# Patient Record
Sex: FEMALE | Race: WHITE | NOT HISPANIC OR LATINO | Employment: UNEMPLOYED | URBAN - METROPOLITAN AREA
[De-identification: names, ages, dates, MRNs, and addresses within clinical notes are randomized per-mention and may not be internally consistent; named-entity substitution may affect disease eponyms.]

---

## 2022-02-17 ENCOUNTER — OFFICE VISIT (OUTPATIENT)
Dept: URGENT CARE | Facility: CLINIC | Age: 27
End: 2022-02-17

## 2022-02-17 ENCOUNTER — APPOINTMENT (OUTPATIENT)
Dept: LAB | Facility: MEDICAL CENTER | Age: 27
End: 2022-02-17
Attending: FAMILY MEDICINE

## 2022-02-17 VITALS
RESPIRATION RATE: 18 BRPM | DIASTOLIC BLOOD PRESSURE: 62 MMHG | OXYGEN SATURATION: 98 % | WEIGHT: 144.6 LBS | BODY MASS INDEX: 24.09 KG/M2 | SYSTOLIC BLOOD PRESSURE: 114 MMHG | HEIGHT: 65 IN | HEART RATE: 68 BPM | TEMPERATURE: 97 F
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
## 2022-02-17 DIAGNOSIS — Z77.21 EXPOSURE TO BLOOD-BORNE PATHOGEN: ICD-10-CM

## 2022-02-17 DIAGNOSIS — S61.239A PUNCTURE WOUND OF FINGER OF RIGHT HAND, INITIAL ENCOUNTER: ICD-10-CM

## 2022-02-17 PROCEDURE — 99203 OFFICE O/P NEW LOW 30 MIN: CPT | Mod: 25 | Performed by: FAMILY MEDICINE

## 2022-02-17 PROCEDURE — 90471 IMMUNIZATION ADMIN: CPT | Performed by: FAMILY MEDICINE

## 2022-02-17 PROCEDURE — 90715 TDAP VACCINE 7 YRS/> IM: CPT | Performed by: FAMILY MEDICINE

## 2022-02-17 ASSESSMENT — ENCOUNTER SYMPTOMS
CHILLS: 0
FEVER: 0
VOMITING: 0
DIZZINESS: 0
NAUSEA: 0
MYALGIAS: 0
SORE THROAT: 0
SHORTNESS OF BREATH: 0
COUGH: 0

## 2022-02-17 NOTE — PATIENT INSTRUCTIONS
Needlestick and Sharps Injury  A needlestick injury happens when a person is stuck with a needle or sharp tool (sharps) that may have someone else's blood on it. Needlestick injuries are most common among health care workers, but can also happen to people in the community who are exposed to needles. A needlestick injury may expose you to blood that carries infections such as:  · Hepatitis B.  · Hepatitis C.  · Human immunodeficiency virus (HIV).  What are the causes?  This injury is caused by a needle or other sharp tool that punctures your skin. It can happen to people who are:  · Giving an injection.  · Drawing blood.  · Performing medical procedures with a needle or scalpel.  · Handling or throwing away used needles (sharps).  · Cleaning equipment or patient care areas.  This injury can also occur if you:  · Accidentally come into contact with a needle that was discarded in a public place.  · Share a needle or inject illegal drugs.  What increases the risk?  This injury is more likely to happen to health care workers who:  · Recap needles.  · Pass sharp objects to another person.  · Do not use or have access to needle safety devices.  · Feel pressure or a sense of urgency in the work place when using needles.  What are the signs or symptoms?  Symptoms of this injury include:  · Pain or irritation at the injury site.  · Bleeding at the injury site.  How is this diagnosed?  This condition is diagnosed based on:  · A physical exam.  · How the injury happened.  Your health care provider may check the medical history of the person whose blood you were exposed to. That person's blood may also be tested.  How is this treated?  If you have a needle stick injury or think you may have been exposed to blood or body fluids:  · Wash the injured area right away with soap and water.  · Place a bandage or clean towel on the wound and apply gentle pressure to stop the bleeding. Do not squeeze or rub the area.  · Notify a work  place supervisor or health care provider right away. Follow any procedures in your work place if applicable.  ? If needed, treatment must be started as soon as possible after the exposure.  · Tell your health care provider if you are pregnant or breastfeeding.  Treatments may include:  · A tetanus booster shot. This shot may be given if you have not had a tetanus booster shot within the past 10 years.  · A hepatitis B vaccination.  · Blood tests. These may be recommended for up to 6 months after the injury to rule out infection.  · Medicines to prevent infection (post-exposure prophylaxis).  · Wound care.  Follow these instructions at home:  Medicines  · Take over-the-counter and prescription medicines only as told by your health care provider.  · If you were prescribed an antibiotic medicine, take it as told by your health care provider. Do not stop using the antibiotic even if you start to feel better.  General instructions  · Keep all follow-up visits as told by your health care provider. This is important.  Wound care  · There are many ways to close and cover a wound. For example, a wound can be covered with sutures, skin glue, or adhesive strips. Follow instructions from your health care provider about:  ? How to take care of your wound.  ? When and how you should change your dressing.  · Keep the dressing dry as told by your health care provider. Do not take baths, swim, use a hot tub, or do anything that would put your wound underwater until your health care provider approves.  · Check your wound every day for signs of infection. Check for:  ? Redness, swelling, or pain.  ? Fluid or blood.  ? Pus or a bad smell.  ? Warmth.  Contact a health care provider if you:  · Have redness, swelling, or pain at the injury site.  · Have a fever.  · Feel anxious, angry, or depressed.  · Have difficulty sleeping.  · Have skin or whites of your eyes that look yellow (jaundice).  · Have belly pain or a feeling of  fullness.  · Have fatigue.  · Feel generally sick (malaise).  · Have frequent infections.  Summary  · A needlestick injury happens when a person is stuck with a needle or sharp object that may have someone else's blood on it. The injury may expose the person to blood that carries infections.  · Treatment starts with cleaning the injured area right away with soap and water.  · Treatment may also include a tetanus booster shot, a hepatitis B vaccine, and medicines to prevent infection.  This information is not intended to replace advice given to you by your health care provider. Make sure you discuss any questions you have with your health care provider.  Document Released: 12/18/2006 Document Revised: 04/10/2020 Document Reviewed: 01/30/2019  Elsevier Patient Education © 2020 [employer]

## 2022-02-17 NOTE — PROGRESS NOTES
"Subjective:   Chary Guadarrama is a 26 y.o. female who presents for Body Fluid Exposure (Used Nedle stick, this morning)        Puncture Wound   Incident onset: Right middle finger puncture wound, uncles insulin syringe needle into the right middle finger, reports initial bleeding which resolved with pressure. Pain location: Right middle finger. Size: Puncture. Injury mechanism: Insulin needle, states uncle is otherwise healthy, no known HIV, no known hepatitis B or C, patient denies history of hepatitis B or C, states childhood hepatitis B immunization. The patient is experiencing no pain. She reports no foreign bodies present. Her tetanus status is out of date.     PMH:  has no past medical history on file.  MEDS: No current outpatient medications on file.  ALLERGIES: Not on File  SURGHX: No past surgical history on file.  SOCHX:    FH: No family history on file.  Review of Systems   Constitutional: Negative for chills and fever.   HENT: Negative for sore throat.    Respiratory: Negative for cough and shortness of breath.    Gastrointestinal: Negative for nausea and vomiting.   Musculoskeletal: Negative for myalgias.   Skin: Negative for rash.   Neurological: Negative for dizziness.        Objective:   /62 (BP Location: Left arm, Patient Position: Sitting)   Pulse 68   Temp 36.1 °C (97 °F) (Temporal)   Resp 18   Ht 1.651 m (5' 5\")   Wt 65.6 kg (144 lb 9.6 oz)   SpO2 98%   BMI 24.06 kg/m²   Physical Exam  Vitals and nursing note reviewed.   Constitutional:       General: She is not in acute distress.     Appearance: She is well-developed.   HENT:      Head: Normocephalic and atraumatic.      Right Ear: External ear normal.      Left Ear: External ear normal.      Nose: Nose normal.      Mouth/Throat:      Mouth: Mucous membranes are moist.   Eyes:      Conjunctiva/sclera: Conjunctivae normal.   Cardiovascular:      Rate and Rhythm: Normal rate.   Pulmonary:      Effort: Pulmonary effort is normal. No " respiratory distress.      Breath sounds: Normal breath sounds.   Abdominal:      General: There is no distension.   Musculoskeletal:         General: Normal range of motion.        Hands:    Skin:     General: Skin is warm and dry.   Neurological:      General: No focal deficit present.      Mental Status: She is alert and oriented to person, place, and time. Mental status is at baseline.      Gait: Gait (gait at baseline) normal.   Psychiatric:         Judgment: Judgment normal.           Assessment/Plan:   1. Needle stick, hypodermic, accidental, initial encounter  - EXPOSED PERSON-SOURCE PT POSITIVE OR UNK (BLOOD & BODY FLUID EXPOSURE); Future  - Referral to establish with [facility]    2. Exposure to blood-borne pathogen  - EXPOSED PERSON-SOURCE PT POSITIVE OR UNK (BLOOD & BODY FLUID EXPOSURE); Future  - Referral to establish with [facility]CP    3. Puncture wound of finger of right hand, initial encounter  - Tdap =>8yo IM        Medical Decision Making/Course:  In the course of preparing for this visit with review of the pertinent past medical history, recent and past clinic visits, current medications, and performing chart, immunization, medical history and medication reconciliation, and in the further course of obtaining the current history pertinent to the clinic visit today, performing an exam and evaluation, ordering and independently evaluating labs, tests including ordering of blood-borne pathogen exposure panel, and/or procedures, prescribing any recommended new medications as noted above and including administration of tetanus immunization during urgent care course, providing any pertinent counseling and education and recommending further coordination of care including initiation of consultation to establish with primary care provider, at least  15 minutes of total time were spent during this encounter.      Discussed close monitoring, return precautions, and supportive measures of maintaining adequate  fluid hydration and caloric intake, relative rest and symptom management as needed for pain and/or fever.    Differential diagnosis, natural history, supportive care, and indications for immediate follow-up discussed.     Advised the patient to follow-up with the primary care physician for recheck, reevaluation, and consideration of further management.    Please note that this dictation was created using voice recognition software. I have worked with consultants from the vendor as well as technical experts from [facility] [employer] to optimize the interface. I have made every reasonable attempt to correct obvious errors, but I expect that there are errors of grammar and possibly content that I did not discover before finalizing the note.

## 2023-05-08 PROBLEM — Z00.00 ENCOUNTER FOR PREVENTIVE HEALTH EXAMINATION: Status: ACTIVE | Noted: 2023-05-08

## 2023-05-22 ENCOUNTER — APPOINTMENT (OUTPATIENT)
Dept: OBGYN | Facility: CLINIC | Age: 28
End: 2023-05-22